# Patient Record
Sex: MALE | Race: WHITE
[De-identification: names, ages, dates, MRNs, and addresses within clinical notes are randomized per-mention and may not be internally consistent; named-entity substitution may affect disease eponyms.]

---

## 2018-06-05 ENCOUNTER — HOSPITAL ENCOUNTER (EMERGENCY)
Dept: HOSPITAL 25 - UCEAST | Age: 63
Discharge: HOME | End: 2018-06-05
Payer: COMMERCIAL

## 2018-06-05 VITALS — DIASTOLIC BLOOD PRESSURE: 92 MMHG | SYSTOLIC BLOOD PRESSURE: 145 MMHG

## 2018-06-05 DIAGNOSIS — F17.210: ICD-10-CM

## 2018-06-05 DIAGNOSIS — H61.23: Primary | ICD-10-CM

## 2018-06-05 DIAGNOSIS — I10: ICD-10-CM

## 2018-06-05 DIAGNOSIS — E78.5: ICD-10-CM

## 2018-06-05 DIAGNOSIS — R09.81: ICD-10-CM

## 2018-06-05 PROCEDURE — 99212 OFFICE O/P EST SF 10 MIN: CPT

## 2018-06-05 PROCEDURE — G0463 HOSPITAL OUTPT CLINIC VISIT: HCPCS

## 2018-07-03 NOTE — UC
Narcisa EMERY Gabriel, scribed for Olive Moya MD on 06/05/18 at 0742 .





 Dental HPI





- HPI Summary


HPI Summary: 





This patient is a 62 year [place] [facility] [place] of 

swelling and pain to the right upper jaw that began yesterday afternoon. The 

patient rates the pain 3/10 in severity. Patient reports sinus congestion and 

right facial pain. Pt states he hasnt see his dentist in years and has poor 

teeth. The last time he was seen was at [facility]. Patient denies poor 

taste in his mouth, cough, fever, and SOB. NKDA. Pt states the pain woke him up 

in the middle of the night and that he sleeps on 3 pillows at night.  Pt 

declined an ear flush and stated that he will perform it at home himself 

because he is concerned with the potential cost. 





- History of Current Complaint


Chief Complaint: Angeli


Stated Complaint: SWOLLEN FACE


Time Seen by Provider: 06/05/18 07:31


Hx Obtained From: Patient


Onset/Duration: Lasting Days, Still Present


Severity: Mild


Pain Intensity: 3


Pain Scale Used: 0-10 Numeric





- Allergies/Home Medications


Allergies/Adverse Reactions: 


 Allergies











Allergy/AdvReac Type Severity Reaction Status Date / Time


 


No Known Allergies Allergy   Verified 06/05/18 07:17











Home Medications: 


 Home Medications





Aspirin 81 mg PO 06/05/18 [History]


Atorvastatin* [Lipitor*] 20 mg PO 1700 06/05/18 [History Confirmed 06/05/18]


Lisinopril 5 mg PO 06/05/18 [History]











PMH/Surg Hx/FS Hx/Imm Hx


Cardiovascular History: Hypertension, Other


Other Cardiovascular History: HLD


Other History Of: 


   Negative For: HIV





- Surgical History


Surgical History: Yes


Surgery Procedure, Year, and Place: FIVE SCREWS IN RIGHT FIBULA





- Family History


Known Family History: 


   Negative: Blood Disorder





- Social History


Alcohol Use: Daily


Substance Use Type: None


Smoking Status (MU): Current Every Day Smoker


Type: eCigarettes


Amount Used/How Often: 1 PPD


Length of Time of Smoking/Using Tobacco: 20-30 YEARS


Have You Smoked in the Last Year: Yes





Review of Systems


ENT: Sinus Congestion


Musculoskeletal: Other: - facial swelling


All Other Systems Reviewed And Are Negative: Yes





Physical Exam





- Summary


Physical Exam Summary: 





Appearance: Well-Nourished


Eye Exam: Normal


ENT Exam: bilateral cerumen impaction 


Respiratory Exam: Normal, no dyspnea, no tachypnea, normal respiratory rate. 

Chest non-tender, Lungs clear, Normal breath sounds, No respiratory distress, 

No accessory muscle use


Cardiovascular Exam: Normal


Cardiovascular: Heart rate regular, good general skin color, good capillary 

refill


Abdominal Exam: Normal


Abdomen Description: Nontender, No Organomegaly, Soft


Bowel Sounds: Present


Musculoskeletal Exam: Normal


Musculoskeletal: Strength Intact


Neurological Exam: Normal: nonfocal, grossly intact


Psychological Exam: Normal: conversing easily and appropriately


Skin Exam: Normal: no visible or reported rash


 


Triage Information Reviewed: Yes


Vital Signs: 


 Initial Vital Signs











Temp  98.5 F   06/05/18 07:14


 


Pulse  103   06/05/18 07:14


 


Resp  18   06/05/18 07:14


 


BP  145/92   06/05/18 07:14


 


Pulse Ox  99   06/05/18 07:14











Vital Signs Reviewed: Yes





Dental Complaint Course/Dx





- Differential Dx/Diagnosis


Provider Diagnoses: Cerumen impaction





Discharge





- Sign-Out/Discharge


Documenting (check all that apply): Patient Departure





- Discharge Plan


Condition: Stable


Disposition: HOME


Prescriptions: 


Amoxicillin/Clavulanate TAB* [Augmentin *] 875 mg PO BID #28 tab


Chlorhexidine MW 0.12% 473ML* [Peridex Mouth Wash 0.12%*] 15 ml MT QID #473 ml


Patient Education Materials:  Dental Abscess (ED), Cerumen Impaction (ED)


Referrals: 


Romero Grant MD [Primary Care Provider] - 


Additional Instructions: 


Your blood pressure was elevated during today's visit, 145/92. Please follow up 

with your primary care provider in 1-2 weeks. 


Seek medical attention for worse or new problems in the meantime.





Follow up with a dentist - next week if possible. 


Yogurt every day while taking antibiotic. 





Follow up with your primary care physician, per routine .  





Bring your medications to your dentist appointment. 





- Billing Disposition and Condition


Condition: STABLE


Disposition: Home





The documentation as recorded by the Narcisa novak Gabriel accurately reflects 

the service I personally performed and the decisions made by me, Olive Moya MD.